# Patient Record
Sex: FEMALE | Race: WHITE | NOT HISPANIC OR LATINO | Employment: OTHER | URBAN - METROPOLITAN AREA
[De-identification: names, ages, dates, MRNs, and addresses within clinical notes are randomized per-mention and may not be internally consistent; named-entity substitution may affect disease eponyms.]

---

## 2017-03-30 ENCOUNTER — HOSPITAL ENCOUNTER (OUTPATIENT)
Dept: RADIOLOGY | Facility: MEDICAL CENTER | Age: 62
End: 2017-03-30
Attending: FAMILY MEDICINE
Payer: COMMERCIAL

## 2017-03-30 DIAGNOSIS — Z13.9 SCREENING: ICD-10-CM

## 2017-03-30 PROCEDURE — 77063 BREAST TOMOSYNTHESIS BI: CPT

## 2018-04-02 ENCOUNTER — HOSPITAL ENCOUNTER (OUTPATIENT)
Dept: RADIOLOGY | Facility: MEDICAL CENTER | Age: 63
End: 2018-04-02
Attending: FAMILY MEDICINE
Payer: COMMERCIAL

## 2018-04-02 DIAGNOSIS — Z12.31 SCREENING MAMMOGRAM, ENCOUNTER FOR: ICD-10-CM

## 2018-04-02 PROCEDURE — 77067 SCR MAMMO BI INCL CAD: CPT

## 2019-04-03 ENCOUNTER — HOSPITAL ENCOUNTER (OUTPATIENT)
Dept: RADIOLOGY | Facility: MEDICAL CENTER | Age: 64
End: 2019-04-03
Attending: FAMILY MEDICINE
Payer: COMMERCIAL

## 2019-04-03 DIAGNOSIS — Z12.39 SCREENING BREAST EXAMINATION: ICD-10-CM

## 2019-04-03 PROCEDURE — 77063 BREAST TOMOSYNTHESIS BI: CPT

## 2019-05-22 ENCOUNTER — APPOINTMENT (RX ONLY)
Dept: URBAN - METROPOLITAN AREA CLINIC 35 | Facility: CLINIC | Age: 64
Setting detail: DERMATOLOGY
End: 2019-05-22

## 2019-05-22 DIAGNOSIS — L81.4 OTHER MELANIN HYPERPIGMENTATION: ICD-10-CM

## 2019-05-22 DIAGNOSIS — Z71.89 OTHER SPECIFIED COUNSELING: ICD-10-CM

## 2019-05-22 DIAGNOSIS — D22 MELANOCYTIC NEVI: ICD-10-CM

## 2019-05-22 DIAGNOSIS — L81.7 PIGMENTED PURPURIC DERMATOSIS: ICD-10-CM

## 2019-05-22 DIAGNOSIS — L57.0 ACTINIC KERATOSIS: ICD-10-CM

## 2019-05-22 DIAGNOSIS — L82.1 OTHER SEBORRHEIC KERATOSIS: ICD-10-CM

## 2019-05-22 PROBLEM — D22.5 MELANOCYTIC NEVI OF TRUNK: Status: ACTIVE | Noted: 2019-05-22

## 2019-05-22 PROCEDURE — 99213 OFFICE O/P EST LOW 20 MIN: CPT | Mod: 25

## 2019-05-22 PROCEDURE — 17000 DESTRUCT PREMALG LESION: CPT

## 2019-05-22 PROCEDURE — ? COUNSELING

## 2019-05-22 PROCEDURE — ? LIQUID NITROGEN

## 2019-05-22 PROCEDURE — ? OBSERVATION AND MEASURE

## 2019-05-22 ASSESSMENT — LOCATION DETAILED DESCRIPTION DERM
LOCATION DETAILED: RIGHT ANTERIOR DISTAL UPPER ARM
LOCATION DETAILED: RIGHT LATERAL TRAPEZIAL NECK
LOCATION DETAILED: MID-FRONTAL SCALP
LOCATION DETAILED: LEFT MEDIAL SUPERIOR CHEST
LOCATION DETAILED: RIGHT PROXIMAL PRETIBIAL REGION
LOCATION DETAILED: RIGHT MEDIAL TRAPEZIAL NECK
LOCATION DETAILED: LEFT PROXIMAL PRETIBIAL REGION
LOCATION DETAILED: RIGHT SUPERIOR UPPER BACK

## 2019-05-22 ASSESSMENT — LOCATION ZONE DERM
LOCATION ZONE: ARM
LOCATION ZONE: SCALP
LOCATION ZONE: TRUNK
LOCATION ZONE: LEG
LOCATION ZONE: NECK

## 2019-05-22 ASSESSMENT — LOCATION SIMPLE DESCRIPTION DERM
LOCATION SIMPLE: ANTERIOR SCALP
LOCATION SIMPLE: POSTERIOR NECK
LOCATION SIMPLE: CHEST
LOCATION SIMPLE: RIGHT UPPER ARM
LOCATION SIMPLE: RIGHT UPPER BACK
LOCATION SIMPLE: RIGHT PRETIBIAL REGION
LOCATION SIMPLE: LEFT PRETIBIAL REGION

## 2019-05-22 NOTE — PROCEDURE: LIQUID NITROGEN
Duration Of Freeze Thaw-Cycle (Seconds): 10
Render Note In Bullet Format When Appropriate: No
Number Of Freeze-Thaw Cycles: 1 freeze-thaw cycle
Post-Care Instructions: I reviewed with the patient in detail post-care instructions. Patient is to wear sunprotection, and avoid picking at any of the treated lesions. Pt may apply Vaseline to crusted or scabbing areas.
Detail Level: Detailed
Consent: The patient's consent was obtained including but not limited to risks of crusting, scabbing, blistering, scarring, darker or lighter pigmentary change, recurrence, incomplete removal and infection.

## 2020-05-20 ENCOUNTER — APPOINTMENT (OUTPATIENT)
Dept: RADIOLOGY | Facility: MEDICAL CENTER | Age: 65
End: 2020-05-20
Attending: FAMILY MEDICINE
Payer: COMMERCIAL

## 2020-05-20 DIAGNOSIS — Z12.31 VISIT FOR SCREENING MAMMOGRAM: ICD-10-CM

## 2020-05-20 PROCEDURE — 77067 SCR MAMMO BI INCL CAD: CPT

## 2020-06-03 ENCOUNTER — APPOINTMENT (RX ONLY)
Dept: URBAN - METROPOLITAN AREA CLINIC 35 | Facility: CLINIC | Age: 65
Setting detail: DERMATOLOGY
End: 2020-06-03

## 2020-06-03 DIAGNOSIS — L82.1 OTHER SEBORRHEIC KERATOSIS: ICD-10-CM

## 2020-06-03 DIAGNOSIS — L81.4 OTHER MELANIN HYPERPIGMENTATION: ICD-10-CM

## 2020-06-03 DIAGNOSIS — L82.0 INFLAMED SEBORRHEIC KERATOSIS: ICD-10-CM

## 2020-06-03 DIAGNOSIS — L90.5 SCAR CONDITIONS AND FIBROSIS OF SKIN: ICD-10-CM

## 2020-06-03 DIAGNOSIS — Z71.89 OTHER SPECIFIED COUNSELING: ICD-10-CM

## 2020-06-03 DIAGNOSIS — D22 MELANOCYTIC NEVI: ICD-10-CM

## 2020-06-03 PROBLEM — D22.5 MELANOCYTIC NEVI OF TRUNK: Status: ACTIVE | Noted: 2020-06-03

## 2020-06-03 PROCEDURE — 99213 OFFICE O/P EST LOW 20 MIN: CPT | Mod: 25

## 2020-06-03 PROCEDURE — 17110 DESTRUCTION B9 LES UP TO 14: CPT

## 2020-06-03 PROCEDURE — ? COUNSELING

## 2020-06-03 PROCEDURE — ? LIQUID NITROGEN

## 2020-06-03 ASSESSMENT — LOCATION DETAILED DESCRIPTION DERM
LOCATION DETAILED: LEFT CENTRAL EYEBROW
LOCATION DETAILED: RIGHT ANTERIOR DISTAL UPPER ARM
LOCATION DETAILED: RIGHT VENTRAL PROXIMAL FOREARM
LOCATION DETAILED: INFERIOR THORACIC SPINE
LOCATION DETAILED: RIGHT SUPERIOR UPPER BACK
LOCATION DETAILED: RIGHT CENTRAL EYEBROW
LOCATION DETAILED: RIGHT MEDIAL TRAPEZIAL NECK
LOCATION DETAILED: RIGHT PROXIMAL DORSAL FOREARM
LOCATION DETAILED: LEFT SUPERIOR MEDIAL UPPER BACK
LOCATION DETAILED: RIGHT LATERAL TRAPEZIAL NECK
LOCATION DETAILED: RIGHT MEDIAL SUPERIOR CHEST
LOCATION DETAILED: POSTERIOR MID-PARIETAL SCALP

## 2020-06-03 ASSESSMENT — LOCATION SIMPLE DESCRIPTION DERM
LOCATION SIMPLE: RIGHT FOREARM
LOCATION SIMPLE: RIGHT UPPER ARM
LOCATION SIMPLE: POSTERIOR SCALP
LOCATION SIMPLE: RIGHT EYEBROW
LOCATION SIMPLE: POSTERIOR NECK
LOCATION SIMPLE: LEFT EYEBROW
LOCATION SIMPLE: CHEST
LOCATION SIMPLE: LEFT UPPER BACK
LOCATION SIMPLE: UPPER BACK
LOCATION SIMPLE: RIGHT UPPER BACK

## 2020-06-03 ASSESSMENT — LOCATION ZONE DERM
LOCATION ZONE: SCALP
LOCATION ZONE: ARM
LOCATION ZONE: NECK
LOCATION ZONE: FACE
LOCATION ZONE: TRUNK

## 2020-06-03 NOTE — PROCEDURE: LIQUID NITROGEN
Post-Care Instructions: I reviewed with the patient in detail post-care instructions. Patient is to wear sunprotection, and avoid picking at any of the treated lesions. Pt may apply Vaseline to crusted or scabbing areas.
Medical Necessity Clause: This procedure was medically necessary because the lesions that were treated were:
Number Of Freeze-Thaw Cycles: 2 freeze-thaw cycles
Render Note In Bullet Format When Appropriate: No
Detail Level: Detailed
Consent: The patient's consent was obtained including but not limited to risks of crusting, scabbing, blistering, scarring, darker or lighter pigmentary change, recurrence, incomplete removal and infection.
Medical Necessity Information: It is in your best interest to select a reason for this procedure from the list below. All of these items fulfill various CMS LCD requirements except the new and changing color options.
Duration Of Freeze Thaw-Cycle (Seconds): 10

## 2020-12-03 ENCOUNTER — TELEPHONE (OUTPATIENT)
Dept: OPHTHALMOLOGY | Facility: MEDICAL CENTER | Age: 65
End: 2020-12-03

## 2021-03-02 ENCOUNTER — OFFICE VISIT (OUTPATIENT)
Dept: OPHTHALMOLOGY | Facility: MEDICAL CENTER | Age: 66
End: 2021-03-02
Payer: MEDICARE

## 2021-03-02 DIAGNOSIS — H49.9 OPHTHALMOPLEGIA: ICD-10-CM

## 2021-03-02 DIAGNOSIS — E05.00 GRAVES DISEASE: ICD-10-CM

## 2021-03-02 DIAGNOSIS — Z96.1 PSEUDOPHAKIA OF BOTH EYES: ICD-10-CM

## 2021-03-02 PROBLEM — H40.2230 CHRONIC PRIMARY ANGLE-CLOSURE GLAUCOMA OF BOTH EYES: Status: ACTIVE | Noted: 2021-03-02

## 2021-03-02 PROCEDURE — 92060 SENSORIMOTOR EXAMINATION: CPT | Performed by: OPHTHALMOLOGY

## 2021-03-02 PROCEDURE — 99204 OFFICE O/P NEW MOD 45 MIN: CPT | Performed by: OPHTHALMOLOGY

## 2021-03-02 RX ORDER — LATANOPROST 50 UG/ML
SOLUTION/ DROPS OPHTHALMIC
COMMUNITY
Start: 2021-02-02

## 2021-03-02 RX ORDER — CYCLOSPORINE 0.5 MG/ML
EMULSION OPHTHALMIC
COMMUNITY
Start: 2021-02-26

## 2021-03-02 ASSESSMENT — REFRACTION_MANIFEST
OS_AXIS: 098
OD_SPHERE: -0.25
OS_CYLINDER: +0.75
METHOD_AUTOREFRACTION: 1
OD_AXIS: 031
OD_CYLINDER: +0.50
OS_SPHERE: -4.00

## 2021-03-02 ASSESSMENT — ENCOUNTER SYMPTOMS
DOUBLE VISION: 1
EYE REDNESS: 1

## 2021-03-02 ASSESSMENT — CUP TO DISC RATIO
OS_RATIO: 0.8
OD_RATIO: 0.7

## 2021-03-02 ASSESSMENT — REFRACTION_WEARINGRX
OS_AXIS: 084
OD_SPHERE: PLANO
SPECS_TYPE: PAL
OS_SPHERE: -2.50
OD_AXIS: 028
OD_ADD: +2.00
OS_CYLINDER: +0.25
OD_CYLINDER: +0.75
OS_ADD: +2.00

## 2021-03-02 ASSESSMENT — VISUAL ACUITY
CORRECTION_TYPE: GLASSES
OD_CC+: -1
OS_CC+: -1
METHOD: SNELLEN - LINEAR
OS_CC: 20/25
OD_CC: 20/20

## 2021-03-02 ASSESSMENT — TONOMETRY
OS_IOP_MMHG: 14
OD_IOP_MMHG: 15
IOP_METHOD: ICARE

## 2021-03-02 ASSESSMENT — EXTERNAL EXAM - LEFT EYE: OS_EXAM: PROPTOSIS

## 2021-03-02 ASSESSMENT — SLIT LAMP EXAM - LIDS
COMMENTS: NORMAL
COMMENTS: NORMAL

## 2021-03-02 ASSESSMENT — CONF VISUAL FIELD
OS_NORMAL: 1
OD_NORMAL: 1

## 2021-03-02 ASSESSMENT — EXTERNAL EXAM - RIGHT EYE: OD_EXAM: PROPTOSIS

## 2021-03-02 NOTE — ASSESSMENT & PLAN NOTE
3/2/2021 - sp stents during cataract extraction, but now back on drops. IOP today 11 OU. Being monitored by [facility]

## 2021-03-02 NOTE — ASSESSMENT & PLAN NOTE
3/2/2021 - IOL in place, monovision. OS for near. Blurry vision without balance from glasses makes fusion more difficut

## 2021-03-02 NOTE — ASSESSMENT & PLAN NOTE
3/2/2021 - in comitant small angle esotropia worse on left and right horizontal gaze associated with a mild abduction deficit, history of high myopia and graves disease. 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 Some break down in fusion when not wearing glasses, and was made monovision following cataract extraction. I discussed option of prisms in glasses or surgery, but since no diplopia with glasses on discussed continuing to monitor.

## 2021-03-02 NOTE — ASSESSMENT & PLAN NOTE
3/2/2021 - mild stare and lid retraction, johnson not appear to be progressive, but discussed if worsens would be a candidate for tepezza

## 2021-03-02 NOTE — PROGRESS NOTES
Peds/Neuro Ophthalmology:   Kenny Castillo M.D.    Date & Time note created:    3/2/2021   11:34 AM     Referring MD / APRN:  Oscar Schrader M.D., No att. providers found    Patient ID:  Name:             Jacy Muhammad   YOB: 1955  Age:                 65 y.o.  female   MRN:               [id number]    Chief Complaint/Reason for Visit:     Esotropia      History of Present Illness:    Jacy Muhammad is a 65 y.o. female   65 yr old referred, for double vision. Pt has a history of cataract surgery and glaucoma she had a drain also put in and had lasik to improve vision. Pt has history of dry eyes, red eyes no other issues. Cataract extraction 2017. Some double vision before that. Stents at same time as cataract surgery. Had LASIK 2002 started glasses at age 10 years. History of Graves and LOPEZ 1991.       Review of Systems:  Review of Systems   Eyes: Positive for double vision and redness.   All other systems reviewed and are negative.      Past Medical History:   Past Medical History:   Diagnosis Date   • Allergy    • Arthritis    • Thyroid disease        Past Surgical History:  Past Surgical History:   Procedure Laterality Date   • ABDOMINAL HYSTERECTOMY TOTAL     • CATARACT EXTRACTION WITH IOL     • EYE SURGERY      lasik   • ROTATOR CUFF REPAIR         Current Outpatient Medications:  Current Outpatient Medications   Medication Sig Dispense Refill   • Levothyroxine Sodium (SYNTHROID PO) Take  by mouth. Takes 112 mcg and 125 mcg     • RESTASIS 0.05 % ophthalmic emulsion INSTILL 1 DROP INTO EACH EYE TWICE DAILY     • latanoprost (XALATAN) 0.005 % Solution        No current facility-administered medications for this visit.       Allergies:  Allergies   Allergen Reactions   • Pcn [Penicillins]    • Sulfamethoxazole        Family History:  Family History   Problem Relation Age of Onset   • Stroke Mother    • Heart Disease Father        Social History:  Social History     Socioeconomic  History   • Marital status: Single     Spouse name: Not on file   • Number of children: Not on file   • Years of education: Not on file   • Highest education level: Not on file   Occupational History   • Not on file   Tobacco Use   • Smoking status: Unknown If Ever Smoked   Substance and Sexual Activity   • Alcohol use: Not on file   • Drug use: Not on file   • Sexual activity: Not on file   Other Topics Concern   • Not on file   Social History Narrative   • Not on file     Social Determinants of Health     Financial Resource Strain:    • Difficulty of Paying Living Expenses:    Food Insecurity:    • Worried About Running Out of Food in the Last Year:    • Ran Out of Food in the Last Year:    Transportation Needs:    • Lack of Transportation (Medical):    • Lack of Transportation (Non-Medical):    Physical Activity:    • Days of Exercise per Week:    • Minutes of Exercise per Session:    Stress:    • Feeling of Stress :    Social Connections:    • Frequency of Communication with Friends and Family:    • Frequency of Social Gatherings with Friends and Family:    • Attends Christian Services:    • Active Member of Clubs or Organizations:    • Attends Club or Organization Meetings:    • Marital Status:    Intimate Partner Violence:    • Fear of Current or Ex-Partner:    • Emotionally Abused:    • Physically Abused:    • Sexually Abused:           Physical Exam:  Physical Exam    Oriented x 3  Weight/BMI: There is no height or weight on file to calculate BMI.  There were no vitals taken for this visit.    Base Eye Exam     Visual Acuity (Snellen - Linear)       Right Left    Dist cc 20/20 -1 20/25 -1    Correction: Glasses          Tonometry ([facility] , 10:19 am )       Right Left    Pressure 15 14          Pupils       Pupils    Right PERRL    Left PERRL          Visual Fields       Right Left     Full Full          Neuro/Psych     Oriented x3: Yes    Mood/Affect: Normal          Dilation     able to view wihtout dilation              Additional Tests     Color       Right Left    Ishihara 9/9 9/9          Stereo     Fly: +    Animals: 3/3    Circles: 8/9            Strabismus Exam       0 0 0   0 0 0                      ET 2 -1  0  Ortho  0  -1  ET 2                     0 0 0   0 0 0                3/2/2021 - without 2 ET distance     Slit Lamp and Fundus Exam     External Exam       Right Left    External Proptosis Proptosis          Slit Lamp Exam       Right Left    Lids/Lashes Normal Normal    Conjunctiva/Sclera White and quiet White and quiet    Cornea Clear Clear    Anterior Chamber Deep and quiet Deep and quiet    Iris Round and reactive Round and reactive    Lens Posterior chamber intraocular lens Posterior chamber intraocular lens    Vitreous Normal Normal          Fundus Exam       Right Left    Disc Tilted cup Tilted cup    C/D Ratio 0.7 0.8    Macula Normal Normal    Vessels Normal Normal    Periphery Normal Normal            Refraction     Wearing Rx       Sphere Cylinder Axis Add    Right [place] +0.75 028 +2.00    Left -2.50 +0.25 084 +2.00    Type: PAL          Manifest Refraction (Auto)       Sphere Cylinder Axis    Right -0.25 +0.50 031    Left -4.00 +0.75 098                Pertinent Lab/Test/Imaging Review:  Review of records from Dr Wray office    Assessment and Plan:     Ophthalmoplegia  3/2/2021 - in comitant small angle esotropia worse on left and right horizontal gaze associated with a mild abduction deficit, history of high myopia and graves disease. 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 Some break down in fusion when not wearing glasses, and was made monovision following cataract extraction. I discussed option of prisms in glasses or surgery, but since no diplopia with glasses on discussed continuing to monitor.     Graves disease  3/2/2021 - mild stare and lid retraction, johnson not appear to be progressive, but  discussed if worsens would be a candidate for tepezza    Chronic primary angle-closure glaucoma of both eyes  3/2/2021 - sp stents during cataract extraction, but now back on drops. IOP today 11 OU. Being monitored by [facility]    Pseudophakia of both eyes  3/2/2021 - IOL in place, monovision. OS for near. Blurry vision without balance from glasses makes fusion more difficut        Kenny Castillo M.D.

## 2021-05-21 ENCOUNTER — HOSPITAL ENCOUNTER (OUTPATIENT)
Dept: RADIOLOGY | Facility: MEDICAL CENTER | Age: 66
End: 2021-05-21
Attending: FAMILY MEDICINE
Payer: MEDICARE

## 2021-05-21 DIAGNOSIS — Z12.31 VISIT FOR SCREENING MAMMOGRAM: ICD-10-CM

## 2021-05-21 PROCEDURE — 77063 BREAST TOMOSYNTHESIS BI: CPT

## 2021-06-07 ENCOUNTER — APPOINTMENT (RX ONLY)
Dept: URBAN - METROPOLITAN AREA CLINIC 35 | Facility: CLINIC | Age: 66
Setting detail: DERMATOLOGY
End: 2021-06-07

## 2021-06-07 DIAGNOSIS — L81.4 OTHER MELANIN HYPERPIGMENTATION: ICD-10-CM

## 2021-06-07 DIAGNOSIS — D22 MELANOCYTIC NEVI: ICD-10-CM

## 2021-06-07 DIAGNOSIS — Z71.89 OTHER SPECIFIED COUNSELING: ICD-10-CM

## 2021-06-07 DIAGNOSIS — L82.0 INFLAMED SEBORRHEIC KERATOSIS: ICD-10-CM

## 2021-06-07 DIAGNOSIS — L82.1 OTHER SEBORRHEIC KERATOSIS: ICD-10-CM

## 2021-06-07 PROBLEM — D22.5 MELANOCYTIC NEVI OF TRUNK: Status: ACTIVE | Noted: 2021-06-07

## 2021-06-07 PROCEDURE — 99213 OFFICE O/P EST LOW 20 MIN: CPT | Mod: 25

## 2021-06-07 PROCEDURE — ? COUNSELING

## 2021-06-07 PROCEDURE — ? LIQUID NITROGEN

## 2021-06-07 PROCEDURE — 17110 DESTRUCTION B9 LES UP TO 14: CPT

## 2021-06-07 ASSESSMENT — LOCATION ZONE DERM
LOCATION ZONE: NECK
LOCATION ZONE: ARM
LOCATION ZONE: SCALP
LOCATION ZONE: TRUNK

## 2021-06-07 ASSESSMENT — LOCATION DETAILED DESCRIPTION DERM
LOCATION DETAILED: RIGHT MEDIAL TRAPEZIAL NECK
LOCATION DETAILED: RIGHT LATERAL TRAPEZIAL NECK
LOCATION DETAILED: LEFT POSTERIOR SHOULDER
LOCATION DETAILED: LEFT SUPERIOR OCCIPITAL SCALP
LOCATION DETAILED: RIGHT SUPERIOR OCCIPITAL SCALP
LOCATION DETAILED: RIGHT SUPERIOR UPPER BACK

## 2021-06-07 ASSESSMENT — LOCATION SIMPLE DESCRIPTION DERM
LOCATION SIMPLE: LEFT OCCIPITAL SCALP
LOCATION SIMPLE: POSTERIOR NECK
LOCATION SIMPLE: LEFT SHOULDER
LOCATION SIMPLE: RIGHT OCCIPITAL SCALP
LOCATION SIMPLE: RIGHT UPPER BACK

## 2021-06-07 NOTE — PROCEDURE: LIQUID NITROGEN
Render Note In Bullet Format When Appropriate: No
Detail Level: Detailed
Consent: The patient's consent was obtained including but not limited to risks of crusting, scabbing, blistering, scarring, darker or lighter pigmentary change, recurrence, incomplete removal and infection.
Medical Necessity Information: It is in your best interest to select a reason for this procedure from the list below. All of these items fulfill various CMS LCD requirements except the new and changing color options.
Medical Necessity Clause: This procedure was medically necessary because the lesions that were treated were:
Duration Of Freeze Thaw-Cycle (Seconds): 10
Number Of Freeze-Thaw Cycles: 2 freeze-thaw cycles
Post-Care Instructions: I reviewed with the patient in detail post-care instructions. Patient is to wear sunprotection, and avoid picking at any of the treated lesions. Pt may apply Vaseline to crusted or scabbing areas.

## 2022-03-02 ENCOUNTER — OFFICE VISIT (OUTPATIENT)
Dept: OPHTHALMOLOGY | Facility: MEDICAL CENTER | Age: 67
End: 2022-03-02
Payer: MEDICARE

## 2022-03-02 DIAGNOSIS — E05.00 GRAVES DISEASE: ICD-10-CM

## 2022-03-02 DIAGNOSIS — H40.2230 CHRONIC PRIMARY ANGLE-CLOSURE GLAUCOMA OF BOTH EYES, UNSPECIFIED GLAUCOMA STAGE: ICD-10-CM

## 2022-03-02 DIAGNOSIS — H49.9 OPHTHALMOPLEGIA: ICD-10-CM

## 2022-03-02 PROCEDURE — 92014 COMPRE OPH EXAM EST PT 1/>: CPT | Performed by: OPHTHALMOLOGY

## 2022-03-02 PROCEDURE — 92060 SENSORIMOTOR EXAMINATION: CPT | Performed by: OPHTHALMOLOGY

## 2022-03-02 RX ORDER — LEVOTHYROXINE SODIUM 0.12 MG/1
TABLET ORAL
COMMUNITY
Start: 2021-12-26

## 2022-03-02 RX ORDER — ATORVASTATIN CALCIUM 10 MG/1
10 TABLET, FILM COATED ORAL DAILY
COMMUNITY
Start: 2022-02-08

## 2022-03-02 ASSESSMENT — REFRACTION_WEARINGRX
OD_SPHERE: -0.25
OS_CYLINDER: +0.50
OD_HBASE: OUT
OS_AXIS: 131
SPECS_TYPE: BIFOCAL
OS_SPHERE: -3.75
OD_VBASE: DOWN
OS_ADD: +2.50
OD_VPRISM: 0.5
OD_AXIS: 010
OD_CYLINDER: +0.75
OS_VBASE: UP
OD_ADD: +2.50
OS_VPRISM: 2.5
OD_HPRISM: 1.0

## 2022-03-02 ASSESSMENT — VISUAL ACUITY
OS_CC: 20/25
OD_CC+: -1
METHOD: SNELLEN - LINEAR
CORRECTION_TYPE: GLASSES
OD_CC: 20/20
OS_CC+: +2

## 2022-03-02 ASSESSMENT — EXTERNAL EXAM - RIGHT EYE: OD_EXAM: PROPTOSIS

## 2022-03-02 ASSESSMENT — CONF VISUAL FIELD
OS_NORMAL: 1
OD_NORMAL: 1

## 2022-03-02 ASSESSMENT — TONOMETRY
OD_IOP_MMHG: 9
IOP_METHOD: I-CARE
OS_IOP_MMHG: 9

## 2022-03-02 ASSESSMENT — ENCOUNTER SYMPTOMS: DOUBLE VISION: 1

## 2022-03-02 ASSESSMENT — CUP TO DISC RATIO
OD_RATIO: 0.7
OS_RATIO: 0.8

## 2022-03-02 ASSESSMENT — REFRACTION_MANIFEST
OS_CYLINDER: +0.75
OD_CYLINDER: +0.50
OS_SPHERE: -4.50
OS_AXIS: 102
METHOD_AUTOREFRACTION: 1
OD_AXIS: 037
OD_SPHERE: -0.50

## 2022-03-02 ASSESSMENT — EXTERNAL EXAM - LEFT EYE: OS_EXAM: PROPTOSIS

## 2022-03-02 ASSESSMENT — SLIT LAMP EXAM - LIDS
COMMENTS: NORMAL
COMMENTS: NORMAL

## 2022-03-02 NOTE — ASSESSMENT & PLAN NOTE
3/2/2021 - sp stents during cataract extraction, but now back on drops. IOP today 11 OU. Being monitored by [facility]  3/2/2022 - IOP stable, now on Xalatan, being followed by glaucoma

## 2022-03-02 NOTE — ASSESSMENT & PLAN NOTE
3/2/2021 - mild stare and lid retraction, johnson not appear to be progressive, but discussed if worsens would be a candidate for tepezza  3/2/2022 - still with mild tare, lid retraction and dry eye. Overall stable and uses restasis

## 2022-03-02 NOTE — ASSESSMENT & PLAN NOTE
3/2/2021 - in comitant small angle esotropia worse on left and right horizontal gaze associated with a mild abduction deficit, history of high myopia and graves disease. 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 Some break down in fusion when not wearing glasses, and was made monovision following cataract extraction. I discussed option of prisms in glasses or surgery, but since no diplopia with glasses on discussed continuing to monitor.   3/2/2022 - Got prism glasses by optom with 2 base up OS, but has induced a small 2 left hyper. Given fusional amplitudes can tolerate. Also possibly has a small 1 base out OD. Orth from horizontal with glasses, but without develops a 2 ET most likely exacerbated by monovosion. Overall stable.

## 2022-03-02 NOTE — PROGRESS NOTES
Peds/Neuro Ophthalmology:   Kenny Castillo M.D.    Date & Time note created:    3/2/2022   11:30 AM     Referring MD / APRN:  Oscar Schrader M.D., No att. providers found    Patient ID:  Name:             Jacy Muhammad   YOB: 1955  Age:                 66 y.o.  female   MRN:               [id number]    Chief Complaint/Reason for Visit:     Graves' Disease (1 year F/u for Ophthalmoplegia for both eyes)      History of Present Illness:    Jacy Muhammad is a 66 y.o. female   Pt is here for 1 year F/u for Ophthalmoplegia and Graves disease. Pt states vision is stable with glasses. No changes since last eye exam. Pt does have dry eyes but she use Restasis BID OU. Pt denies headaches. Pt states a year ago she had really bad vision but since she got her new glasses with prism she has noticed the double vision has improved. Using Xalatan at night. Got new glasses in October. When got new glasses got with prism.       Review of Systems:  Review of Systems   Eyes: Positive for double vision.        Ophthalmoplegia  Graves disease  Dry eyes OU   All other systems reviewed and are negative.      Past Medical History:   Past Medical History:   Diagnosis Date   • Allergy    • Arthritis    • Hyperlipidemia    • Thyroid disease        Past Surgical History:  Past Surgical History:   Procedure Laterality Date   • ABDOMINAL HYSTERECTOMY TOTAL     • CATARACT EXTRACTION WITH IOL     • EYE SURGERY      lasik   • ROTATOR CUFF REPAIR         Current Outpatient Medications:  Current Outpatient Medications   Medication Sig Dispense Refill   • atorvastatin (LIPITOR) 10 MG Tab Take 10 mg by mouth every day.     • RESTASIS 0.05 % ophthalmic emulsion INSTILL 1 DROP INTO EACH EYE TWICE DAILY     • latanoprost (XALATAN) 0.005 % Solution      • Levothyroxine Sodium (SYNTHROID PO) Take  by mouth. Takes 112 mcg and 125 mcg     • levothyroxine (SYNTHROID) 125 MCG Tab TAKE 1 TABLET BY MOUTH EVERY OTHER DAY ON AN  EMPTY STOMACH ON ODD DAYS (ALTERNATING WITH 112 MCG ON EVEN DAYS) (Patient not taking: Reported on 3/2/2022)       No current facility-administered medications for this visit.       Allergies:  Allergies   Allergen Reactions   • Pcn [Penicillins]    • Sulfamethoxazole        Family History:  Family History   Problem Relation Age of Onset   • Stroke Mother    • Heart Disease Father        Social History:  Social History     Socioeconomic History   • Marital status: Single     Spouse name: Not on file   • Number of children: Not on file   • Years of education: Not on file   • Highest education level: Not on file   Occupational History   • Not on file   Tobacco Use   • Smoking status: Unknown If Ever Smoked   • Smokeless tobacco: Never Used   Substance and Sexual Activity   • Alcohol use: Not on file   • Drug use: Not on file   • Sexual activity: Not on file   Other Topics Concern   • Not on file   Social History Narrative   • Not on file     Social Determinants of Health     Financial Resource Strain: Not on file   Food Insecurity: Not on file   Transportation Needs: Not on file   Physical Activity: Not on file   Stress: Not on file   Social Connections: Not on file   Intimate Partner Violence: Not on file   Housing Stability: Not on file          Physical Exam:  Physical Exam    Oriented x 3  Weight/BMI: There is no height or weight on file to calculate BMI.  There were no vitals taken for this visit.    Base Eye Exam     Visual Acuity (Snellen - Linear)       Right Left    Dist cc 20/20 -1 20/25 +2    Dist ph cc NI NI    Correction: Glasses          Tonometry ([facility], 10:42 AM)       Right Left    Pressure 9 9          Pupils       Pupils    Right PERRL    Left PERRL          Visual Fields       Right Left     Full Full          Neuro/Psych     Oriented x3: Yes    Mood/Affect: Normal          Dilation     Both eyes: able to view wihtout dilation @ 11:27 AM            Additional Tests     Stereo     Fly: +             Strabismus Exam     Correction: sc    Distance Near Near +3DS N Bifocals                    0 0 0   0 0 0                      ET 2 -1  0  Ortho  0  -1  ET 2                     0 0 0   0 0 0                3/2/2021 - without 2 ET distance  3/2/2022 - With glasses on that has a 2 base UP OS, has a 2 Left hyper. Still 2 ET without glasses     Slit Lamp and Fundus Exam     External Exam       Right Left    External Proptosis Proptosis          Slit Lamp Exam       Right Left    Lids/Lashes Normal Normal    Conjunctiva/Sclera White and quiet White and quiet    Cornea Clear Clear    Anterior Chamber Deep and quiet Deep and quiet    Iris Round and reactive Round and reactive    Lens Posterior chamber intraocular lens Posterior chamber intraocular lens    Vitreous Normal Normal          Fundus Exam       Right Left    Disc Tilted cup Tilted cup    C/D Ratio 0.7 0.8    Macula Normal Normal    Vessels Normal Normal    Periphery Normal Normal            Refraction     Wearing Rx       Sphere Cylinder Axis Add Horz Prism Vert Prism    Right -0.25 +0.75 010 +2.50 1.0 out 0.5 down    Left -3.75 +0.50 131 +2.50  2.5 up    Age: 4m    Type: Bifocal          Manifest Refraction (Auto)       Sphere Cylinder Axis    Right -0.50 +0.50 037    Left -4.50 +0.75 102                Pertinent Lab/Test/Imaging Review:      Assessment and Plan:     Graves disease  3/2/2021 - mild stare and lid retraction, johnson not appear to be progressive, but discussed if worsens would be a candidate for tepezza  3/2/2022 - still with mild tare, lid retraction and dry eye. Overall stable and uses restasis    Ophthalmoplegia  3/2/2021 - in comitant small angle esotropia worse on left and right horizontal gaze associated with a mild abduction deficit, history of high myopia and graves disease. 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 Some break down in fusion when not  wearing glasses, and was made monovision following cataract extraction. I discussed option of prisms in glasses or surgery, but since no diplopia with glasses on discussed continuing to monitor.   3/2/2022 - Got prism glasses by optom with 2 base up OS, but has induced a small 2 left hyper. Given fusional amplitudes can tolerate. Also possibly has a small 1 base out OD. Orth from horizontal with glasses, but without develops a 2 ET most likely exacerbated by monovosion. Overall stable.     Chronic primary angle-closure glaucoma of both eyes  3/2/2021 - sp stents during cataract extraction, but now back on drops. IOP today 11 OU. Being monitored by [facility]  3/2/2022 - IOP stable, now on Xalatan, being followed by glaucoma        Kenny Castillo M.D.

## 2022-05-23 ENCOUNTER — HOSPITAL ENCOUNTER (OUTPATIENT)
Dept: RADIOLOGY | Facility: MEDICAL CENTER | Age: 67
End: 2022-05-23
Attending: FAMILY MEDICINE
Payer: MEDICARE

## 2022-05-23 DIAGNOSIS — Z12.31 VISIT FOR SCREENING MAMMOGRAM: ICD-10-CM

## 2022-05-23 PROCEDURE — 77063 BREAST TOMOSYNTHESIS BI: CPT

## 2022-06-08 ENCOUNTER — APPOINTMENT (RX ONLY)
Dept: URBAN - METROPOLITAN AREA CLINIC 35 | Facility: CLINIC | Age: 67
Setting detail: DERMATOLOGY
End: 2022-06-08

## 2022-06-08 DIAGNOSIS — Z71.89 OTHER SPECIFIED COUNSELING: ICD-10-CM

## 2022-06-08 DIAGNOSIS — L81.4 OTHER MELANIN HYPERPIGMENTATION: ICD-10-CM

## 2022-06-08 DIAGNOSIS — L82.0 INFLAMED SEBORRHEIC KERATOSIS: ICD-10-CM

## 2022-06-08 DIAGNOSIS — M71 OTHER BURSOPATHIES: ICD-10-CM

## 2022-06-08 DIAGNOSIS — D22 MELANOCYTIC NEVI: ICD-10-CM

## 2022-06-08 DIAGNOSIS — L82.1 OTHER SEBORRHEIC KERATOSIS: ICD-10-CM

## 2022-06-08 PROBLEM — D22.5 MELANOCYTIC NEVI OF TRUNK: Status: ACTIVE | Noted: 2022-06-08

## 2022-06-08 PROBLEM — M71.341 OTHER BURSAL CYST, RIGHT HAND: Status: ACTIVE | Noted: 2022-06-08

## 2022-06-08 PROCEDURE — 99213 OFFICE O/P EST LOW 20 MIN: CPT | Mod: 25

## 2022-06-08 PROCEDURE — ? LIQUID NITROGEN

## 2022-06-08 PROCEDURE — ? COUNSELING

## 2022-06-08 PROCEDURE — 17110 DESTRUCTION B9 LES UP TO 14: CPT

## 2022-06-08 PROCEDURE — ? OBSERVATION AND MEASURE

## 2022-06-08 ASSESSMENT — LOCATION DETAILED DESCRIPTION DERM
LOCATION DETAILED: LEFT SUPERIOR MEDIAL UPPER BACK
LOCATION DETAILED: RIGHT LATERAL TRAPEZIAL NECK
LOCATION DETAILED: LEFT SUPERIOR UPPER BACK
LOCATION DETAILED: RIGHT MEDIAL TRAPEZIAL NECK
LOCATION DETAILED: RIGHT SUPERIOR UPPER BACK
LOCATION DETAILED: RIGHT SUPERIOR MEDIAL UPPER BACK
LOCATION DETAILED: LEFT MEDIAL UPPER BACK
LOCATION DETAILED: LEFT SUPERIOR LATERAL MALAR CHEEK
LOCATION DETAILED: LEFT POSTERIOR SHOULDER
LOCATION DETAILED: RIGHT MID DORSAL MIDDLE FINGER
LOCATION DETAILED: RIGHT INFERIOR UPPER BACK

## 2022-06-08 ASSESSMENT — LOCATION SIMPLE DESCRIPTION DERM
LOCATION SIMPLE: POSTERIOR NECK
LOCATION SIMPLE: RIGHT MIDDLE FINGER
LOCATION SIMPLE: LEFT UPPER BACK
LOCATION SIMPLE: RIGHT UPPER BACK
LOCATION SIMPLE: LEFT SHOULDER
LOCATION SIMPLE: LEFT CHEEK

## 2022-06-08 ASSESSMENT — LOCATION ZONE DERM
LOCATION ZONE: FINGER
LOCATION ZONE: FACE
LOCATION ZONE: NECK
LOCATION ZONE: ARM
LOCATION ZONE: TRUNK

## 2022-06-08 NOTE — PROCEDURE: LIQUID NITROGEN
Consent: The patient's consent was obtained including but not limited to risks of crusting, scabbing, blistering, scarring, darker or lighter pigmentary change, recurrence, incomplete removal and infection.
Medical Necessity Clause: This procedure was medically necessary because the lesions that were treated were:
Spray Paint Text: The liquid nitrogen was applied to the skin utilizing a spray paint frosting technique.
Detail Level: Detailed
Render Note In Bullet Format When Appropriate: No
Medical Necessity Information: It is in your best interest to select a reason for this procedure from the list below. All of these items fulfill various CMS LCD requirements except the new and changing color options.
Number Of Freeze-Thaw Cycles: 2 freeze-thaw cycles
Show Applicator Variable?: Yes
Post-Care Instructions: I reviewed with the patient in detail post-care instructions. Patient is to wear sunprotection, and avoid picking at any of the treated lesions. Pt may apply Vaseline to crusted or scabbing areas.
Duration Of Freeze Thaw-Cycle (Seconds): 10

## 2023-04-24 ENCOUNTER — OFFICE VISIT (OUTPATIENT)
Dept: OPHTHALMOLOGY | Facility: MEDICAL CENTER | Age: 68
End: 2023-04-24
Payer: MEDICARE

## 2023-04-24 DIAGNOSIS — H40.003 GLAUCOMA SUSPECT OF BOTH EYES: ICD-10-CM

## 2023-04-24 DIAGNOSIS — Z96.1 PSEUDOPHAKIA OF BOTH EYES: ICD-10-CM

## 2023-04-24 DIAGNOSIS — E05.00 GRAVES DISEASE: ICD-10-CM

## 2023-04-24 DIAGNOSIS — H40.2230 CHRONIC PRIMARY ANGLE-CLOSURE GLAUCOMA OF BOTH EYES, UNSPECIFIED GLAUCOMA STAGE: ICD-10-CM

## 2023-04-24 DIAGNOSIS — H49.9 OPHTHALMOPLEGIA: ICD-10-CM

## 2023-04-24 PROCEDURE — 99214 OFFICE O/P EST MOD 30 MIN: CPT | Mod: 25 | Performed by: OPHTHALMOLOGY

## 2023-04-24 PROCEDURE — 92015 DETERMINE REFRACTIVE STATE: CPT | Performed by: OPHTHALMOLOGY

## 2023-04-24 PROCEDURE — 92060 SENSORIMOTOR EXAMINATION: CPT | Performed by: OPHTHALMOLOGY

## 2023-04-24 PROCEDURE — 92250 FUNDUS PHOTOGRAPHY W/I&R: CPT | Performed by: OPHTHALMOLOGY

## 2023-04-24 RX ORDER — HYDROCHLOROTHIAZIDE 12.5 MG/1
12.5 TABLET ORAL DAILY
COMMUNITY
Start: 2023-03-23

## 2023-04-24 RX ORDER — LEVOTHYROXINE SODIUM 112 UG/1
TABLET ORAL
COMMUNITY
Start: 2023-03-14

## 2023-04-24 RX ORDER — VIT C/B6/B5/MAGNESIUM/HERB 173 50-5-6-5MG
CAPSULE ORAL
COMMUNITY

## 2023-04-24 ASSESSMENT — REFRACTION_MANIFEST
OD_SPHERE: -0.50
METHOD_AUTOREFRACTION: 1
OS_SPHERE: -4.50
OS_CYLINDER: +0.50
OS_AXIS: 058
OD_CYLINDER: +0.25
OD_AXIS: 051

## 2023-04-24 ASSESSMENT — EXTERNAL EXAM - LEFT EYE: OS_EXAM: PROPTOSIS

## 2023-04-24 ASSESSMENT — REFRACTION_WEARINGRX
SPECS_TYPE: PAL
OS_SPHERE: -3.50
OS_AXIS: 152
OD_CYLINDER: +0.75
OD_SPHERE: -0.25
OD_AXIS: 003
OS_CYLINDER: +0.25

## 2023-04-24 ASSESSMENT — SLIT LAMP EXAM - LIDS
COMMENTS: NORMAL
COMMENTS: NORMAL

## 2023-04-24 ASSESSMENT — CONF VISUAL FIELD
OS_INFERIOR_NASAL_RESTRICTION: 0
OD_SUPERIOR_TEMPORAL_RESTRICTION: 0
OD_SUPERIOR_NASAL_RESTRICTION: 0
OD_NORMAL: 1
OS_INFERIOR_TEMPORAL_RESTRICTION: 0
OS_SUPERIOR_NASAL_RESTRICTION: 0
OD_INFERIOR_NASAL_RESTRICTION: 0
OS_SUPERIOR_TEMPORAL_RESTRICTION: 0
OS_NORMAL: 1
OD_INFERIOR_TEMPORAL_RESTRICTION: 0

## 2023-04-24 ASSESSMENT — ENCOUNTER SYMPTOMS: BLURRED VISION: 1

## 2023-04-24 ASSESSMENT — TONOMETRY
OD_IOP_MMHG: 9
OS_IOP_MMHG: 9
IOP_METHOD: I-CARE

## 2023-04-24 ASSESSMENT — EXTERNAL EXAM - RIGHT EYE: OD_EXAM: PROPTOSIS

## 2023-04-24 ASSESSMENT — CUP TO DISC RATIO
OD_RATIO: 0.7
OS_RATIO: 0.8

## 2023-04-24 ASSESSMENT — VISUAL ACUITY
METHOD: SNELLEN - LINEAR
OD_CC: 20/20
OS_CC: 20/20

## 2023-04-24 NOTE — ASSESSMENT & PLAN NOTE
3/2/2021 - sp stents during cataract extraction, but now back on drops. IOP today 11 OU. Being monitored by [facility]  3/2/2022 - IOP stable, now on Xalatan, being followed by glaucoma  4/24/2023-IOP stable on Xalatan.  OCT nerve fiber layer thickness 60 OU with superior and inferior thinning.  However there is significant peripapillary atrophy worse in the left eye secondary to her myopia

## 2023-04-24 NOTE — ASSESSMENT & PLAN NOTE
3/2/2021 - in comitant small angle esotropia worse on left and right horizontal gaze associated with a mild abduction deficit, history of high myopia and graves disease. 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 Some break down in fusion when not wearing glasses, and was made monovision following cataract extraction. I discussed option of prisms in glasses or surgery, but since no diplopia with glasses on discussed continuing to monitor.   3/2/2022 - Got prism glasses by optom with 2 base up OS, but has induced a small 2 left hyper. Given fusional amplitudes can tolerate. Also possibly has a small 1 base out OD. Orth from horizontal with glasses, but without develops a 2 ET most likely exacerbated by monovosion. Overall stable.   4/24/2023-still has very mild limited abduction deficit however I cannot measure a vertical component.  I then proceeded with Pettit negar testing which did not demonstrate a vertical deviation.  She states that sometimes when she is tired looking to the side she might get a little double vision however in prism adaptation testing today she did actually better without prisms.  We will therefore give her new glasses Rx and take out the prism

## 2023-04-24 NOTE — ASSESSMENT & PLAN NOTE
3/2/2021 - IOL in place, monovision. OS for near. Blurry vision without balance from glasses makes fusion more difficut  4/24/2023-IOL in place, continued monovision, no development of significant PCO.

## 2023-04-24 NOTE — ASSESSMENT & PLAN NOTE
3/2/2021 - mild stare and lid retraction, johnson not appear to be progressive, but discussed if worsens would be a candidate for tepezza  3/2/2022 - still with mild tare, lid retraction and dry eye. Overall stable and uses restasis  4/24/2023-still with mild stare, lid retraction, dry eye and moderate proptosis.  Discussed Tepezza however at this time she was not interested.

## 2023-04-24 NOTE — PROGRESS NOTES
Peds/Neuro Ophthalmology:   Kenny Castillo M.D.    Date & Time note created:    4/24/2023   2:59 PM     Referring MD / APRN:  Oscar Schrader M.D., No att. providers found    Patient ID:  Name:             Jacy Muhammad   YOB: 1955  Age:                 67 y.o.  female   MRN:               [id number]    Chief Complaint/Reason for Visit:     Graves' Disease (1 year follow up)      History of Present Illness:    Jacy Muhammad is a 67 y.o. female   1 year follow up for graves disease. No pain or discomfort. Pt is using eye drops per Dr. Alonso for dry eyes and glaucoma. Currently using Restasis BID and Latanoprost QHS. Pt states vision has decreased with correction. Mainly the right eye. No headaches.      Review of Systems:  Review of Systems   Eyes:  Positive for blurred vision.        Graves disease OU   All other systems reviewed and are negative.    Past Medical History:   Past Medical History:   Diagnosis Date    Allergy     Arthritis     Hyperlipidemia     Thyroid disease        Past Surgical History:  Past Surgical History:   Procedure Laterality Date    ABDOMINAL HYSTERECTOMY TOTAL      CATARACT EXTRACTION WITH IOL      EYE SURGERY      lasik    ROTATOR CUFF REPAIR         Current Outpatient Medications:  Current Outpatient Medications   Medication Sig Dispense Refill    hydroCHLOROthiazide (HYDRODIURIL) 12.5 MG tablet Take 12.5 mg by mouth every day.      levothyroxine (SYNTHROID) 112 MCG Tab TAKE 1 TABLET BY MOUTH ON EVEN DAYS      Calcium Carb-Cholecalciferol (CALCIUM 500 + D3 PO) Take  by mouth.      Turmeric 500 MG Cap Take  by mouth.      Lutein-Zeaxanthin 15-4.75 MG Cap Take  by mouth.      atorvastatin (LIPITOR) 10 MG Tab Take 10 mg by mouth every day.      levothyroxine (SYNTHROID) 125 MCG Tab       RESTASIS 0.05 % ophthalmic emulsion INSTILL 1 DROP INTO EACH EYE TWICE DAILY      latanoprost (XALATAN) 0.005 % Solution       Levothyroxine Sodium (SYNTHROID PO) Take   by mouth. Takes 112 mcg and 125 mcg (Patient not taking: Reported on 4/24/2023)       No current facility-administered medications for this visit.       Allergies:  Allergies   Allergen Reactions    Pcn [Penicillins]     Sulfamethoxazole        Family History:  Family History   Problem Relation Age of Onset    Stroke Mother     Heart Disease Father        Social History:  Social History     Socioeconomic History    Marital status: Single     Spouse name: Not on file    Number of children: Not on file    Years of education: Not on file    Highest education level: Not on file   Occupational History    Not on file   Tobacco Use    Smoking status: Unknown    Smokeless tobacco: Never   Substance and Sexual Activity    Alcohol use: Not on file    Drug use: Not on file    Sexual activity: Not on file   Other Topics Concern    Not on file   Social History Narrative    Not on file     Social Determinants of Health     Financial Resource Strain: Not on file   Food Insecurity: Not on file   Transportation Needs: Not on file   Physical Activity: Not on file   Stress: Not on file   Social Connections: Not on file   Intimate Partner Violence: Not on file   Housing Stability: Not on file          Physical Exam:  Physical Exam    Oriented x 3  Weight/BMI: There is no height or weight on file to calculate BMI.  There were no vitals taken for this visit.    Base Eye Exam       Visual Acuity (Snellen - Linear)         Right Left    Dist cc 20/20 20/20              Tonometry (i-care, 1:33 PM)         Right Left    Pressure 9 9              Pupils         Pupils    Right PERRL    Left PERRL              Visual Fields         Right Left     Full Full              Neuro/Psych       Oriented x3: Yes    Mood/Affect: Normal                  Strabismus Exam       Correction: sc      Distance Near Near +3DS N Bifocals                      0 0 0   0 0 0                      ET 2 -1  0  Ortho  0  -1  ET 2                     0 0 0   0 0 0                 3/2/2021 - without 2 ET distance  3/2/2022 - With glasses on that has a 2 base UP OS, has a 2 Left hyper. Still 2 ET without glasses       Slit Lamp and Fundus Exam       External Exam         Right Left    External Proptosis Proptosis              Slit Lamp Exam         Right Left    Lids/Lashes Normal Normal    Conjunctiva/Sclera White and quiet White and quiet    Cornea Clear Clear    Anterior Chamber Deep and quiet Deep and quiet    Iris Round and reactive Round and reactive    Lens Posterior chamber intraocular lens Posterior chamber intraocular lens    Vitreous Normal Normal              Fundus Exam         Right Left    Disc Tilted cup Tilted cup    C/D Ratio 0.7 0.8    Macula Normal Normal    Vessels Normal Normal    Periphery Normal Normal                  Refraction       Wearing Rx         Sphere Cylinder Axis    Right -0.25 +0.75 003    Left -3.50 +0.25 152      Type: PAL              Manifest Refraction (Auto)         Sphere Cylinder Axis    Right -0.50 +0.25 051    Left -4.50 +0.50 058              Final Rx         Sphere Cylinder Axis Add    Right -0.50   +2.50    Left -4.50 +0.25 060 +2.50                    Pertinent Lab/Test/Imaging Review:      Assessment and Plan:     Ophthalmoplegia  3/2/2021 - in comitant small angle esotropia worse on left and right horizontal gaze associated with a mild abduction deficit, history of high myopia and graves disease. 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 Some break down in fusion when not wearing glasses, and was made monovision following cataract extraction. I discussed option of prisms in glasses or surgery, but since no diplopia with glasses on discussed continuing to monitor.   3/2/2022 - Got prism glasses by optom with 2 base up OS, but has induced a small 2 left hyper. Given fusional amplitudes can tolerate. Also possibly has a small 1 base out OD. Orth from  horizontal with glasses, but without develops a 2 ET most likely exacerbated by monovosion. Overall stable.   4/24/2023-still has very mild limited abduction deficit however I cannot measure a vertical component.  I then proceeded with Pettit negar testing which did not demonstrate a vertical deviation.  She states that sometimes when she is tired looking to the side she might get a little double vision however in prism adaptation testing today she did actually better without prisms.  We will therefore give her new glasses Rx and take out the prism    Graves disease  3/2/2021 - mild stare and lid retraction, johnson not appear to be progressive, but discussed if worsens would be a candidate for tepezza  3/2/2022 - still with mild tare, lid retraction and dry eye. Overall stable and uses restasis  4/24/2023-still with mild stare, lid retraction, dry eye and moderate proptosis.  Discussed Tepezza however at this time she was not interested.    Chronic primary angle-closure glaucoma of both eyes  3/2/2021 - sp stents during cataract extraction, but now back on drops. IOP today 11 OU. Being monitored by [facility]  3/2/2022 - IOP stable, now on Xalatan, being followed by glaucoma  4/24/2023-IOP stable on Xalatan.  OCT nerve fiber layer thickness 60 OU with superior and inferior thinning.  However there is significant peripapillary atrophy worse in the left eye secondary to her myopia    Pseudophakia of both eyes  3/2/2021 - IOL in place, monovision. OS for near. Blurry vision without balance from glasses makes fusion more difficut  4/24/2023-IOL in place, continued monovision, no development of significant PCO.    Level 4 follow-up.  30 minutes spent.  This including discussion of to present including risks and benefits, extended prism adaptation testing, Pettit negar testing, discussing about eliminating prisms from glasses, giving new Rx, formulating note in epic.    Kenny Castillo M.D.

## 2023-05-24 ENCOUNTER — HOSPITAL ENCOUNTER (OUTPATIENT)
Dept: RADIOLOGY | Facility: MEDICAL CENTER | Age: 68
End: 2023-05-24
Attending: FAMILY MEDICINE
Payer: MEDICARE

## 2023-05-24 DIAGNOSIS — Z12.31 VISIT FOR SCREENING MAMMOGRAM: ICD-10-CM

## 2023-05-24 PROCEDURE — 77063 BREAST TOMOSYNTHESIS BI: CPT

## 2023-07-05 ENCOUNTER — APPOINTMENT (RX ONLY)
Dept: URBAN - METROPOLITAN AREA CLINIC 35 | Facility: CLINIC | Age: 68
Setting detail: DERMATOLOGY
End: 2023-07-05

## 2023-07-05 DIAGNOSIS — D22 MELANOCYTIC NEVI: ICD-10-CM

## 2023-07-05 DIAGNOSIS — M71 OTHER BURSOPATHIES: ICD-10-CM

## 2023-07-05 DIAGNOSIS — L82.1 OTHER SEBORRHEIC KERATOSIS: ICD-10-CM

## 2023-07-05 DIAGNOSIS — Z71.89 OTHER SPECIFIED COUNSELING: ICD-10-CM

## 2023-07-05 DIAGNOSIS — L81.4 OTHER MELANIN HYPERPIGMENTATION: ICD-10-CM | Status: STABLE

## 2023-07-05 PROBLEM — D22.5 MELANOCYTIC NEVI OF TRUNK: Status: ACTIVE | Noted: 2023-07-05

## 2023-07-05 PROBLEM — M71.341 OTHER BURSAL CYST, RIGHT HAND: Status: ACTIVE | Noted: 2023-07-05

## 2023-07-05 PROCEDURE — ? OBSERVATION AND MEASURE

## 2023-07-05 PROCEDURE — ? COUNSELING

## 2023-07-05 PROCEDURE — 99213 OFFICE O/P EST LOW 20 MIN: CPT

## 2023-07-05 ASSESSMENT — LOCATION ZONE DERM
LOCATION ZONE: NECK
LOCATION ZONE: SCALP
LOCATION ZONE: FACE
LOCATION ZONE: FINGER
LOCATION ZONE: TRUNK
LOCATION ZONE: ARM

## 2023-07-05 ASSESSMENT — LOCATION DETAILED DESCRIPTION DERM
LOCATION DETAILED: LEFT SUPERIOR PARIETAL SCALP
LOCATION DETAILED: LEFT POSTERIOR SHOULDER
LOCATION DETAILED: RIGHT MID DORSAL MIDDLE FINGER
LOCATION DETAILED: RIGHT MEDIAL TRAPEZIAL NECK
LOCATION DETAILED: RIGHT SUPERIOR UPPER BACK
LOCATION DETAILED: LEFT SUPERIOR LATERAL MALAR CHEEK
LOCATION DETAILED: RIGHT LATERAL TRAPEZIAL NECK

## 2023-07-05 ASSESSMENT — LOCATION SIMPLE DESCRIPTION DERM
LOCATION SIMPLE: LEFT SHOULDER
LOCATION SIMPLE: POSTERIOR NECK
LOCATION SIMPLE: LEFT CHEEK
LOCATION SIMPLE: RIGHT MIDDLE FINGER
LOCATION SIMPLE: SCALP
LOCATION SIMPLE: RIGHT UPPER BACK

## 2024-04-30 ENCOUNTER — OFFICE VISIT (OUTPATIENT)
Dept: OPHTHALMOLOGY | Facility: MEDICAL CENTER | Age: 69
End: 2024-04-30
Payer: MEDICARE

## 2024-04-30 DIAGNOSIS — H40.2230 CHRONIC PRIMARY ANGLE-CLOSURE GLAUCOMA OF BOTH EYES, UNSPECIFIED GLAUCOMA STAGE: ICD-10-CM

## 2024-04-30 DIAGNOSIS — H49.9 OPHTHALMOPLEGIA: ICD-10-CM

## 2024-04-30 DIAGNOSIS — E05.00 GRAVES DISEASE: ICD-10-CM

## 2024-04-30 DIAGNOSIS — Z96.1 PSEUDOPHAKIA OF BOTH EYES: ICD-10-CM

## 2024-04-30 RX ORDER — LATANOPROSTENE BUNOD 0.24 MG/ML
SOLUTION/ DROPS OPHTHALMIC
COMMUNITY

## 2024-04-30 ASSESSMENT — REFRACTION_WEARINGRX
OD_SPHERE: -0.50
OS_SPHERE: -4.50
OD_VBASE: UP
OD_ADD: +2.50
OS_CYLINDER: +0.50
OS_ADD: +2.50
OS_AXIS: 060
OD_ADD: +2.50
SPECS_TYPE: BIFOCAL
OS_CYLINDER: +0.25
OD_AXIS: 045
OD_SPHERE: -0.50
OD_CYLINDER: +0.50
OD_VPRISM: 1/2
OS_SPHERE: -3.50
OS_AXIS: 145
OS_ADD: +2.50

## 2024-04-30 ASSESSMENT — TONOMETRY
IOP_METHOD: APPLANATION
OS_IOP_MMHG: 10
OD_IOP_MMHG: 10
OS_IOP_MMHG: 11
OD_IOP_MMHG: 11

## 2024-04-30 ASSESSMENT — CONF VISUAL FIELD
OD_SUPERIOR_TEMPORAL_RESTRICTION: 0
OD_INFERIOR_TEMPORAL_RESTRICTION: 0
OD_NORMAL: 1
OS_INFERIOR_TEMPORAL_RESTRICTION: 0
OD_INFERIOR_NASAL_RESTRICTION: 0
OS_INFERIOR_NASAL_RESTRICTION: 0
OS_NORMAL: 1
OS_SUPERIOR_NASAL_RESTRICTION: 0
OS_SUPERIOR_TEMPORAL_RESTRICTION: 0
OD_SUPERIOR_NASAL_RESTRICTION: 0

## 2024-04-30 ASSESSMENT — CUP TO DISC RATIO
OD_RATIO: 0.7
OS_RATIO: 0.8

## 2024-04-30 ASSESSMENT — REFRACTION_MANIFEST
OS_CYLINDER: +0.25
OD_SPHERE: -0.75
OD_AXIS: 038
OD_CYLINDER: +0.50
METHOD_AUTOREFRACTION: 1
OS_SPHERE: -4.50
OS_AXIS: 086

## 2024-04-30 ASSESSMENT — VISUAL ACUITY
OD_CC: J1+
OD_CC: 20/20
OS_CC: 20/25
OS_CC: J1+
METHOD: SNELLEN - LINEAR
CORRECTION_TYPE: GLASSES

## 2024-04-30 ASSESSMENT — SLIT LAMP EXAM - LIDS
COMMENTS: NORMAL
COMMENTS: NORMAL

## 2024-04-30 ASSESSMENT — EXTERNAL EXAM - LEFT EYE: OS_EXAM: PROPTOSIS

## 2024-04-30 ASSESSMENT — EXTERNAL EXAM - RIGHT EYE: OD_EXAM: PROPTOSIS

## 2024-04-30 NOTE — PROGRESS NOTES
Peds/Neuro Ophthalmology:   Kenny Castillo M.D.    Date & Time note created:    4/30/2024   3:51 PM     Referring MD / APRN:  Oscar Schrader M.D., No att. providers found    Patient ID:  Name:             Jacy Muhammad   YOB: 1955  Age:                 68 y.o.  female   MRN:               [id number]    Chief Complaint/Reason for Visit:     Other (Graves Disease)      History of Present Illness:    Jacy Muhammad is a 68 y.o. female   Follow up graves disease,ophthalmoplegia and glaucoma.Glaucoma drop has changed to Vyzulta.Vision good with glasses.Patient saw  last wednesday.        Review of Systems:  Review of Systems   All other systems reviewed and are negative.      Past Medical History:   Past Medical History:   Diagnosis Date    Allergy     Arthritis     Hyperlipidemia     Thyroid disease        Past Surgical History:  Past Surgical History:   Procedure Laterality Date    ABDOMINAL HYSTERECTOMY TOTAL      CATARACT EXTRACTION WITH IOL      EYE SURGERY      lasik    ROTATOR CUFF REPAIR         Current Outpatient Medications:  Current Outpatient Medications   Medication Sig Dispense Refill    Latanoprostene Bunod (VYZULTA) 0.024 % Solution Administer  into affected eye(s).      hydroCHLOROthiazide (HYDRODIURIL) 12.5 MG tablet Take 12.5 mg by mouth every day.      levothyroxine (SYNTHROID) 112 MCG Tab 137 mcg.      Calcium Carb-Cholecalciferol (CALCIUM 500 + D3 PO) Take  by mouth.      Turmeric 500 MG Cap Take  by mouth.      atorvastatin (LIPITOR) 10 MG Tab Take 10 mg by mouth every day.      levothyroxine (SYNTHROID) 125 MCG Tab       RESTASIS 0.05 % ophthalmic emulsion INSTILL 1 DROP INTO EACH EYE TWICE DAILY      Lutein-Zeaxanthin 15-4.75 MG Cap Take  by mouth. (Patient not taking: Reported on 4/30/2024)      latanoprost (XALATAN) 0.005 % Solution  (Patient not taking: Reported on 4/30/2024)      Levothyroxine Sodium (SYNTHROID PO) Take  by mouth. Takes 112 mcg and  125 mcg (Patient not taking: Reported on 4/30/2024)       No current facility-administered medications for this visit.       Allergies:  Allergies   Allergen Reactions    Pcn [Penicillins]     Sulfamethoxazole        Family History:  Family History   Problem Relation Age of Onset    Stroke Mother     Heart Disease Father        Social History:  Social History     Socioeconomic History    Marital status: Single     Spouse name: Not on file    Number of children: Not on file    Years of education: Not on file    Highest education level: Not on file   Occupational History    Not on file   Tobacco Use    Smoking status: Unknown    Smokeless tobacco: Never   Substance and Sexual Activity    Alcohol use: Not on file    Drug use: Not on file    Sexual activity: Not on file   Other Topics Concern    Not on file   Social History Narrative    Retired     Social Determinants of Health     Financial Resource Strain: Not on file   Food Insecurity: Not on file   Transportation Needs: Not on file   Physical Activity: Not on file   Stress: Not on file   Social Connections: Not on file   Intimate Partner Violence: Not on file   Housing Stability: Not on file          Physical Exam:  Physical Exam    Oriented x 3  Weight/BMI: There is no height or weight on file to calculate BMI.  There were no vitals taken for this visit.    Base Eye Exam       Visual Acuity (Snellen - Linear)         Right Left    Dist cc 20/20 20/25    Near cc J1+ J1+      Correction: Glasses              Tonometry (i care, 1:20 PM)         Right Left    Pressure 11 10              Tonometry #2 (Applanation, 1:57 PM)         Right Left    Pressure 10 11              Pupils         Pupils    Right PERRL    Left PERRL              Visual Fields         Right Left     Full Full              Neuro/Psych       Oriented x3: Yes    Mood/Affect: Normal                  Strabismus Exam       Correction: sc      Distance Near Near +3DS N Bifocals                      0 0 0    0 0 0                      ET 2 -1  0  Ortho  0  -1  ET 2                     0 0 0   0 0 0                3/2/2021 - without 2 ET distance  3/2/2022 - With glasses on that has a 2 base UP OS, has a 2 Left hyper. Still 2 ET without glasses       Slit Lamp and Fundus Exam       External Exam         Right Left    External Proptosis Proptosis              Slit Lamp Exam         Right Left    Lids/Lashes Normal Normal    Conjunctiva/Sclera White and quiet White and quiet    Cornea Clear Clear    Anterior Chamber Deep and quiet Deep and quiet    Iris Round and reactive Round and reactive    Lens Posterior chamber intraocular lens Posterior chamber intraocular lens    Vitreous Normal Normal              Fundus Exam         Right Left    Disc Tilted cup Tilted cup    C/D Ratio 0.7 0.8    Macula Normal Normal    Vessels Normal Normal    Periphery Normal Normal                  Refraction       Wearing Rx         Sphere Cylinder Axis Add Vert Prism    Right -0.50 +0.50 045 +2.50 1/2 up    Left -3.50 +0.50 145 +2.50       Type: Bifocal              Wearing Rx #2         Sphere Cylinder Axis Add Vert Prism    Right -0.50   +2.50     Left -4.50 +0.25 060 +2.50               Manifest Refraction (Auto)         Sphere Cylinder Axis    Right -0.75 +0.50 038    Left -4.50 +0.25 086                    Pertinent Lab/Test/Imaging Review:      Assessment and Plan:     Ophthalmoplegia  3/2/2021 - in comitant small angle esotropia worse on left and right horizontal gaze associated with a mild abduction deficit, history of high myopia and graves disease. 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 Some break down in fusion when not wearing glasses, and was made monovision following cataract extraction. I discussed option of prisms in glasses or surgery, but since no diplopia with glasses on discussed continuing to monitor.   3/2/2022 - Got prism glasses  by optom with 2 base up OS, but has induced a small 2 left hyper. Given fusional amplitudes can tolerate. Also possibly has a small 1 base out OD. Orth from horizontal with glasses, but without develops a 2 ET most likely exacerbated by monovosion. Overall stable.   4/24/2023-still has very mild limited abduction deficit however I cannot measure a vertical component.  I then proceeded with Pettit negar testing which did not demonstrate a vertical deviation.  She states that sometimes when she is tired looking to the side she might get a little double vision however in prism adaptation testing today she did actually better without prisms.  We will therefore give her new glasses Rx and take out the prism  4/30/2024 -never got Rx.  However had virtual visit with optometrist who gave new Rx with one half base up prism right eye.  Can break down to a small intermittent esotropia but currently fusing.  Therefore will monitor    Graves disease  3/2/2021 - mild stare and lid retraction, johnson not appear to be progressive, but discussed if worsens would be a candidate for tepezza  3/2/2022 - still with mild tare, lid retraction and dry eye. Overall stable and uses restasis  4/24/2023-still with mild stare, lid retraction, dry eye and moderate proptosis.  Discussed Tepezza however at this time she was not interested.  4/30/2024 -overall stable.  No apparent progression    Chronic primary angle-closure glaucoma of both eyes  3/2/2021 - sp stents during cataract extraction, but now back on drops. IOP today 11 OU. Being monitored by [facility]  3/2/2022 - IOP stable, now on Xalatan, being followed by glaucoma  4/24/2023-IOP stable on Xalatan.  OCT nerve fiber layer thickness 60 OU with superior and inferior thinning.  However there is significant peripapillary atrophy worse in the left eye secondary to her myopia  4/30/2024 - In left filed of vision test by [facility]. Changed to Vyzulta.  IOP low today.  OCT  neurofibrillary thickness stable at 67 OD 57 OS.  Stable cup to disc ratio    Pseudophakia of both eyes  3/2/2021 - IOL in place, monovision. OS for near. Blurry vision without balance from glasses makes fusion more difficut  4/24/2023-IOL in place, continued monovision, no development of significant PCO.  4/30/2024 -  overall no change        Kenny Castillo M.D.

## 2024-04-30 NOTE — ASSESSMENT & PLAN NOTE
3/2/2021 - in comitant small angle esotropia worse on left and right horizontal gaze associated with a mild abduction deficit, history of high myopia and graves disease. 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 Some break down in fusion when not wearing glasses, and was made monovision following cataract extraction. I discussed option of prisms in glasses or surgery, but since no diplopia with glasses on discussed continuing to monitor.   3/2/2022 - Got prism glasses by optom with 2 base up OS, but has induced a small 2 left hyper. Given fusional amplitudes can tolerate. Also possibly has a small 1 base out OD. Orth from horizontal with glasses, but without develops a 2 ET most likely exacerbated by monovosion. Overall stable.   4/24/2023-still has very mild limited abduction deficit however I cannot measure a vertical component.  I then proceeded with Pettit negar testing which did not demonstrate a vertical deviation.  She states that sometimes when she is tired looking to the side she might get a little double vision however in prism adaptation testing today she did actually better without prisms.  We will therefore give her new glasses Rx and take out the prism  4/30/2024 -never got Rx.  However had virtual visit with optometrist who gave new Rx with one half base up prism right eye.  Can break down to a small intermittent esotropia but currently fusing.  Therefore will monitor

## 2024-04-30 NOTE — ASSESSMENT & PLAN NOTE
3/2/2021 - mild stare and lid retraction, johnson not appear to be progressive, but discussed if worsens would be a candidate for tepezza  3/2/2022 - still with mild tare, lid retraction and dry eye. Overall stable and uses restasis  4/24/2023-still with mild stare, lid retraction, dry eye and moderate proptosis.  Discussed Tepezza however at this time she was not interested.  4/30/2024 -overall stable.  No apparent progression

## 2024-04-30 NOTE — ASSESSMENT & PLAN NOTE
3/2/2021 - sp stents during cataract extraction, but now back on drops. IOP today 11 OU. Being monitored by [facility]  3/2/2022 - IOP stable, now on Xalatan, being followed by glaucoma  4/24/2023-IOP stable on Xalatan.  OCT nerve fiber layer thickness 60 OU with superior and inferior thinning.  However there is significant peripapillary atrophy worse in the left eye secondary to her myopia  4/30/2024 - In left filed of vision test by [facility]. Changed to Vyzulta.  IOP low today.  OCT neurofibrillary thickness stable at 67 OD 57 OS.  Stable cup to disc ratio

## 2024-04-30 NOTE — ASSESSMENT & PLAN NOTE
3/2/2021 - IOL in place, monovision. OS for near. Blurry vision without balance from glasses makes fusion more difficut  4/24/2023-IOL in place, continued monovision, no development of significant PCO.  4/30/2024 -  overall no change

## 2024-06-10 ENCOUNTER — HOSPITAL ENCOUNTER (OUTPATIENT)
Dept: RADIOLOGY | Facility: MEDICAL CENTER | Age: 69
End: 2024-06-10
Attending: FAMILY MEDICINE
Payer: COMMERCIAL

## 2024-06-10 DIAGNOSIS — Z12.31 SCREENING MAMMOGRAM, ENCOUNTER FOR: ICD-10-CM

## 2024-06-10 PROCEDURE — 77063 BREAST TOMOSYNTHESIS BI: CPT

## 2024-06-21 NOTE — PROCEDURE: REASSURANCE
Detail Level: Generalized
Hide Include Location In Plan Question?: No
Attending and PA/NP shared services statement (NON-critical care):

## 2024-07-10 ENCOUNTER — APPOINTMENT (RX ONLY)
Dept: URBAN - METROPOLITAN AREA CLINIC 35 | Facility: CLINIC | Age: 69
Setting detail: DERMATOLOGY
End: 2024-07-10

## 2024-07-10 DIAGNOSIS — D22 MELANOCYTIC NEVI: ICD-10-CM

## 2024-07-10 DIAGNOSIS — L57.0 ACTINIC KERATOSIS: ICD-10-CM

## 2024-07-10 DIAGNOSIS — L82.1 OTHER SEBORRHEIC KERATOSIS: ICD-10-CM

## 2024-07-10 DIAGNOSIS — Z71.89 OTHER SPECIFIED COUNSELING: ICD-10-CM

## 2024-07-10 DIAGNOSIS — L81.4 OTHER MELANIN HYPERPIGMENTATION: ICD-10-CM | Status: UNCHANGED

## 2024-07-10 PROBLEM — D22.5 MELANOCYTIC NEVI OF TRUNK: Status: ACTIVE | Noted: 2024-07-10

## 2024-07-10 PROCEDURE — 99213 OFFICE O/P EST LOW 20 MIN: CPT | Mod: 25

## 2024-07-10 PROCEDURE — ? OBSERVATION AND MEASURE

## 2024-07-10 PROCEDURE — 17000 DESTRUCT PREMALG LESION: CPT

## 2024-07-10 PROCEDURE — ? LIQUID NITROGEN

## 2024-07-10 PROCEDURE — ? COUNSELING

## 2024-07-10 ASSESSMENT — LOCATION SIMPLE DESCRIPTION DERM
LOCATION SIMPLE: NOSE
LOCATION SIMPLE: LEFT CHEEK
LOCATION SIMPLE: LEFT SHOULDER
LOCATION SIMPLE: SCALP
LOCATION SIMPLE: RIGHT UPPER BACK
LOCATION SIMPLE: POSTERIOR NECK
LOCATION SIMPLE: RIGHT FOREHEAD

## 2024-07-10 ASSESSMENT — LOCATION ZONE DERM
LOCATION ZONE: NOSE
LOCATION ZONE: FACE
LOCATION ZONE: ARM
LOCATION ZONE: TRUNK
LOCATION ZONE: NECK
LOCATION ZONE: SCALP

## 2024-07-10 ASSESSMENT — LOCATION DETAILED DESCRIPTION DERM
LOCATION DETAILED: NASAL TIP
LOCATION DETAILED: LEFT SUPERIOR LATERAL MALAR CHEEK
LOCATION DETAILED: RIGHT LATERAL TRAPEZIAL NECK
LOCATION DETAILED: LEFT POSTERIOR SHOULDER
LOCATION DETAILED: LEFT SUPERIOR PARIETAL SCALP
LOCATION DETAILED: RIGHT SUPERIOR UPPER BACK
LOCATION DETAILED: RIGHT LATERAL FOREHEAD
LOCATION DETAILED: RIGHT MEDIAL TRAPEZIAL NECK

## 2024-07-10 NOTE — PROCEDURE: LIQUID NITROGEN
Consent: The patient's consent was obtained including but not limited to risks of crusting, scabbing, blistering, scarring, darker or lighter pigmentary change, recurrence, incomplete removal and infection.
Show Aperture Variable?: Yes
Render Note In Bullet Format When Appropriate: No
Number Of Freeze-Thaw Cycles: 1 freeze-thaw cycle
Detail Level: Detailed
Post-Care Instructions: I reviewed with the patient in detail post-care instructions. Patient is to wear sunprotection, and avoid picking at any of the treated lesions. Pt may apply Vaseline to crusted or scabbing areas.
Duration Of Freeze Thaw-Cycle (Seconds): 10
Application Tool (Optional): Cry-AC

## 2025-05-20 ENCOUNTER — APPOINTMENT (OUTPATIENT)
Dept: OPHTHALMOLOGY | Facility: MEDICAL CENTER | Age: 70
End: 2025-05-20
Payer: MEDICARE

## 2025-05-20 DIAGNOSIS — H40.2230 CHRONIC PRIMARY ANGLE-CLOSURE GLAUCOMA OF BOTH EYES, UNSPECIFIED GLAUCOMA STAGE: Primary | ICD-10-CM

## 2025-05-20 DIAGNOSIS — Z96.1 PSEUDOPHAKIA OF BOTH EYES: ICD-10-CM

## 2025-05-20 DIAGNOSIS — H49.9 OPHTHALMOPLEGIA: ICD-10-CM

## 2025-05-20 DIAGNOSIS — E05.00 GRAVES DISEASE: ICD-10-CM

## 2025-05-20 PROCEDURE — 92083 EXTENDED VISUAL FIELD XM: CPT | Performed by: OPHTHALMOLOGY

## 2025-05-20 PROCEDURE — 99214 OFFICE O/P EST MOD 30 MIN: CPT | Mod: 25 | Performed by: OPHTHALMOLOGY

## 2025-05-20 PROCEDURE — 92060 SENSORIMOTOR EXAMINATION: CPT | Performed by: OPHTHALMOLOGY

## 2025-05-20 PROCEDURE — 92250 FUNDUS PHOTOGRAPHY W/I&R: CPT | Performed by: OPHTHALMOLOGY

## 2025-05-20 ASSESSMENT — REFRACTION_WEARINGRX
OD_CYLINDER: +0.25
OS_AXIS: 151
OD_VPRISM: 0.25
OD_HPRISM: 1.00
OS_CYLINDER: +0.50
SPECS_TYPE: PAL
OD_ADD: +2.50
OS_VPRISM: 2.75
OS_SPHERE: -3.50
OD_SPHERE: -0.25
OD_AXIS: 028
OS_ADD: +2.50
OS_HPRISM: 0.25

## 2025-05-20 ASSESSMENT — TONOMETRY
OD_IOP_MMHG: 9
OS_IOP_MMHG: 10
IOP_METHOD: ICARE

## 2025-05-20 ASSESSMENT — VISUAL ACUITY
OS_CC+: +2
OS_CC: 20/25
OD_CC+: -2
OD_CC: 20/20
METHOD: SNELLEN - LINEAR
CORRECTION_TYPE: GLASSES

## 2025-05-20 ASSESSMENT — CUP TO DISC RATIO
OD_RATIO: 0.7
OS_RATIO: 0.8

## 2025-05-20 ASSESSMENT — REFRACTION_MANIFEST
METHOD_AUTOREFRACTION: 1
OS_CYLINDER: +0.50
OD_SPHERE: -0.75
OS_AXIS: 113
OS_SPHERE: -4.50
OD_CYLINDER: +0.25
OD_AXIS: 067

## 2025-05-20 ASSESSMENT — CONF VISUAL FIELD
OD_SUPERIOR_NASAL_RESTRICTION: 0
OD_NORMAL: 1
OD_INFERIOR_NASAL_RESTRICTION: 0
OD_INFERIOR_TEMPORAL_RESTRICTION: 0
OD_SUPERIOR_TEMPORAL_RESTRICTION: 0
OS_INFERIOR_NASAL_RESTRICTION: 3

## 2025-05-20 ASSESSMENT — EXTERNAL EXAM - LEFT EYE: OS_EXAM: PROPTOSIS

## 2025-05-20 ASSESSMENT — EXTERNAL EXAM - RIGHT EYE: OD_EXAM: PROPTOSIS

## 2025-05-20 ASSESSMENT — SLIT LAMP EXAM - LIDS
COMMENTS: NORMAL
COMMENTS: NORMAL

## 2025-05-20 NOTE — PROGRESS NOTES
Peds/Neuro Ophthalmology:   Kenny Castillo M.D.    Date & Time note created:    5/20/2025   4:13 PM     Referring MD / APRN:  Oscar Schrader M.D., No att. providers found    Patient ID:  Name:             Jacy Muhammad   YOB: 1955  Age:                 69 y.o.  female   MRN:               [id number]    Chief Complaint/Reason for Visit:     Other (Ophthalmoplegia )      History of Present Illness:    Jacy Muhammad is a 69 y.o. female   Patient here for ophthalmoplegia follow up. Patient states without glasses, still having to tilt right with double vision. Patient states while wearing glasses double vision clears.     Other        Review of Systems:  Review of Systems   Eyes:         Ophthalmoplegia   Chronic primary angle-closure glaucoma of both eyes  Pseudophakia    Endo/Heme/Allergies:         Graves disease    All other systems reviewed and are negative.      Past Medical History:   Past Medical History[1]    Past Surgical History:  Past Surgical History[2]    Current Outpatient Medications:  Current Medications[3]    Allergies:  Allergies[4]    Family History:  Family History   Problem Relation Age of Onset    Stroke Mother     Heart Disease Father        Social History:  Social History     Socioeconomic History    Marital status: Single     Spouse name: Not on file    Number of children: Not on file    Years of education: Not on file    Highest education level: Not on file   Occupational History    Not on file   Tobacco Use    Smoking status: Unknown    Smokeless tobacco: Never   Substance and Sexual Activity    Alcohol use: Not on file    Drug use: Not on file    Sexual activity: Not on file   Other Topics Concern    Not on file   Social History Narrative    Retired     Social Drivers of Health     Financial Resource Strain: Not on file   Food Insecurity: Not on file   Transportation Needs: Not on file   Physical Activity: Not on file   Stress: Not on file   Social  Connections: Not on file   Intimate Partner Violence: Not on file   Housing Stability: Not on file          Physical Exam:  Physical Exam    Oriented x 3  Weight/BMI: There is no height or weight on file to calculate BMI.  There were no vitals taken for this visit.    Base Eye Exam       Visual Acuity (Snellen - Linear)         Right Left    Dist cc 20/20 -2 20/25 +2      Correction: Glasses              Tonometry (Icare, 1:17 PM)         Right Left    Pressure 9 10              Pupils         Pupils    Right PERRL    Left PERRL              Visual Fields         Right Left     Full                                 Extraocular Movement         Right Left     Full Full              Neuro/Psych       Oriented x3: Yes    Mood/Affect: Normal                  Additional Tests       Color         Right Left    Ishihara 8/8 8/8              Stereo       Fly: +                  Strabismus Exam       Correction: sc      Distance Near Near +3DS N Bifocals                      0 0 0   0 0 0                      ET 2 -1  0  Ortho  0  -1  ET 2                     0 0 0   0 0 0                3/2/2021 - without 2 ET distance  3/2/2022 - With glasses on that has a 2 base UP OS, has a 2 Left hyper. Still 2 ET without glasses       Slit Lamp and Fundus Exam       External Exam         Right Left    External Proptosis Proptosis              Slit Lamp Exam         Right Left    Lids/Lashes Normal Normal    Conjunctiva/Sclera White and quiet White and quiet    Cornea Clear Clear    Anterior Chamber Deep and quiet Deep and quiet    Iris Round and reactive Round and reactive    Lens Posterior chamber intraocular lens Posterior chamber intraocular lens    Vitreous Normal Normal              Fundus Exam         Right Left    Disc Tilted cup Tilted cup    C/D Ratio 0.7 0.8    Macula Normal Normal    Vessels Normal Normal    Periphery Normal Normal                  Refraction       Wearing Rx         Sphere Cylinder Axis Add Horz Prism Vert  Prism    Right -0.25 +0.25 028 +2.50 1.00 0.25    Left -3.50 +0.50 151 +2.50 0.25 2.75      Type: PAL              Manifest Refraction (Auto)         Sphere Cylinder Axis    Right -0.75 +0.25 067    Left -4.50 +0.50 113                    Pertinent Lab/Test/Imaging Review:      Assessment and Plan:     Chronic primary angle-closure glaucoma of both eyes  3/2/2021 - sp stents during cataract extraction, but now back on drops. IOP today 11 OU. Being monitored by [facility]  3/2/2022 - IOP stable, now on Xalatan, being followed by glaucoma  4/24/2023-IOP stable on Xalatan.  OCT nerve fiber layer thickness 60 OU with superior and inferior thinning.  However there is significant peripapillary atrophy worse in the left eye secondary to her myopia  4/30/2024 - In left filed of vision test by [facility]. Changed to Vyzulta.  IOP low today.  OCT neurofibrillary thickness stable at 67 OD 57 OS.  Stable cup to disc ratio  5/20/2025-overall stable.  No increased cupping.  OCT new fibrillar thickness 60 OD 59 OS, Clemons visual field obtained that demonstrated stable inferior arcuate defect within small nasal step.    Graves disease  3/2/2021 - mild stare and lid retraction, johnson not appear to be progressive, but discussed if worsens would be a candidate for tepezza  3/2/2022 - still with mild tare, lid retraction and dry eye. Overall stable and uses restasis  4/24/2023-still with mild stare, lid retraction, dry eye and moderate proptosis.  Discussed Tepezza however at this time she was not interested.  4/30/2024 -overall stable.  No apparent progression  5/20/2025-no progression    Ophthalmoplegia  3/2/2021 - in comitant small angle esotropia worse on left and right horizontal gaze associated with a mild abduction deficit, history of high myopia and graves disease. 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 Some break down in fusion  when not wearing glasses, and was made monovision following cataract extraction. I discussed option of prisms in glasses or surgery, but since no diplopia with glasses on discussed continuing to monitor.   3/2/2022 - Got prism glasses by optom with 2 base up OS, but has induced a small 2 left hyper. Given fusional amplitudes can tolerate. Also possibly has a small 1 base out OD. Orth from horizontal with glasses, but without develops a 2 ET most likely exacerbated by monovosion. Overall stable.   4/24/2023-still has very mild limited abduction deficit however I cannot measure a vertical component.  I then proceeded with Pettit negar testing which did not demonstrate a vertical deviation.  She states that sometimes when she is tired looking to the side she might get a little double vision however in prism adaptation testing today she did actually better without prisms.  We will therefore give her new glasses Rx and take out the prism  4/30/2024 -never got Rx.  However had virtual visit with optometrist who gave new Rx with one half base up prism right eye.  Can break down to a small intermittent esotropia but currently fusing.  Therefore will monitor  5/20/2025-doing well when wearing glasses Rx    Pseudophakia of both eyes  3/2/2021 - IOL in place, monovision. OS for near. Blurry vision without balance from glasses makes fusion more difficut  4/24/2023-IOL in place, continued monovision, no development of significant PCO.  4/30/2024 -  overall no change  5/20/2025-IOL intact        Kenny Castillo M.D.         [1]   Past Medical History:  Diagnosis Date    Allergy     Arthritis     Hyperlipidemia     Thyroid disease    [2]   Past Surgical History:  Procedure Laterality Date    ABDOMINAL HYSTERECTOMY TOTAL      CATARACT EXTRACTION WITH IOL      EYE SURGERY      lasik    ROTATOR CUFF REPAIR     [3]   Current Outpatient Medications   Medication Sig Dispense Refill    levothyroxine (SYNTHROID) 112 MCG Tab 137 mcg.  (Patient taking differently: 100 mcg every day.)      Latanoprostene Bunod (VYZULTA) 0.024 % Solution Administer  into affected eye(s).      hydroCHLOROthiazide (HYDRODIURIL) 12.5 MG tablet Take 12.5 mg by mouth every day.      Calcium Carb-Cholecalciferol (CALCIUM 500 + D3 PO) Take  by mouth.      Turmeric 500 MG Cap Take  by mouth.      Lutein-Zeaxanthin 15-4.75 MG Cap Take  by mouth. (Patient not taking: Reported on 4/30/2024)      atorvastatin (LIPITOR) 10 MG Tab Take 10 mg by mouth every day.      levothyroxine (SYNTHROID) 125 MCG Tab       RESTASIS 0.05 % ophthalmic emulsion INSTILL 1 DROP INTO EACH EYE TWICE DAILY      latanoprost (XALATAN) 0.005 % Solution  (Patient not taking: Reported on 4/30/2024)      Levothyroxine Sodium (SYNTHROID PO) Take  by mouth. Takes 112 mcg and 125 mcg (Patient not taking: Reported on 4/30/2024)       No current facility-administered medications for this visit.   [4]   Allergies  Allergen Reactions    Pcn [Penicillins]     Sulfamethoxazole

## 2025-05-20 NOTE — ASSESSMENT & PLAN NOTE
3/2/2021 - mild stare and lid retraction, johnson not appear to be progressive, but discussed if worsens would be a candidate for tepezza  3/2/2022 - still with mild tare, lid retraction and dry eye. Overall stable and uses restasis  4/24/2023-still with mild stare, lid retraction, dry eye and moderate proptosis.  Discussed Tepezza however at this time she was not interested.  4/30/2024 -overall stable.  No apparent progression  5/20/2025-no progression

## 2025-05-20 NOTE — ASSESSMENT & PLAN NOTE
3/2/2021 - IOL in place, monovision. OS for near. Blurry vision without balance from glasses makes fusion more difficut  4/24/2023-IOL in place, continued monovision, no development of significant PCO.  4/30/2024 -  overall no change  5/20/2025-IOL intact

## 2025-05-20 NOTE — ASSESSMENT & PLAN NOTE
3/2/2021 - sp stents during cataract extraction, but now back on drops. IOP today 11 OU. Being monitored by [facility]  3/2/2022 - IOP stable, now on Xalatan, being followed by glaucoma  4/24/2023-IOP stable on Xalatan.  OCT nerve fiber layer thickness 60 OU with superior and inferior thinning.  However there is significant peripapillary atrophy worse in the left eye secondary to her myopia  4/30/2024 - In left filed of vision test by [facility]. Changed to Vyzulta.  IOP low today.  OCT neurofibrillary thickness stable at 67 OD 57 OS.  Stable cup to disc ratio  5/20/2025-overall stable.  No increased cupping.  OCT new fibrillar thickness 60 OD 59 OS, Clemons visual field obtained that demonstrated stable inferior arcuate defect within small nasal step.

## 2025-05-20 NOTE — ASSESSMENT & PLAN NOTE
3/2/2021 - in comitant small angle esotropia worse on left and right horizontal gaze associated with a mild abduction deficit, history of high myopia and graves disease. 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 Some break down in fusion when not wearing glasses, and was made monovision following cataract extraction. I discussed option of prisms in glasses or surgery, but since no diplopia with glasses on discussed continuing to monitor.   3/2/2022 - Got prism glasses by optom with 2 base up OS, but has induced a small 2 left hyper. Given fusional amplitudes can tolerate. Also possibly has a small 1 base out OD. Orth from horizontal with glasses, but without develops a 2 ET most likely exacerbated by monovosion. Overall stable.   4/24/2023-still has very mild limited abduction deficit however I cannot measure a vertical component.  I then proceeded with Pettit negar testing which did not demonstrate a vertical deviation.  She states that sometimes when she is tired looking to the side she might get a little double vision however in prism adaptation testing today she did actually better without prisms.  We will therefore give her new glasses Rx and take out the prism  4/30/2024 -never got Rx.  However had virtual visit with optometrist who gave new Rx with one half base up prism right eye.  Can break down to a small intermittent esotropia but currently fusing.  Therefore will monitor  5/20/2025-doing well when wearing glasses Rx

## 2025-06-11 ENCOUNTER — HOSPITAL ENCOUNTER (OUTPATIENT)
Dept: RADIOLOGY | Facility: MEDICAL CENTER | Age: 70
End: 2025-06-11
Attending: FAMILY MEDICINE
Payer: MEDICARE

## 2025-06-11 DIAGNOSIS — Z12.31 VISIT FOR SCREENING MAMMOGRAM: ICD-10-CM

## 2025-06-11 PROCEDURE — 77063 BREAST TOMOSYNTHESIS BI: CPT

## 2025-07-10 ENCOUNTER — APPOINTMENT (OUTPATIENT)
Dept: URBAN - METROPOLITAN AREA CLINIC 35 | Facility: CLINIC | Age: 70
Setting detail: DERMATOLOGY
End: 2025-07-10

## 2025-07-10 DIAGNOSIS — D485 NEOPLASM OF UNCERTAIN BEHAVIOR OF SKIN: ICD-10-CM

## 2025-07-10 DIAGNOSIS — L57.0 ACTINIC KERATOSIS: ICD-10-CM

## 2025-07-10 DIAGNOSIS — Z71.89 OTHER SPECIFIED COUNSELING: ICD-10-CM

## 2025-07-10 DIAGNOSIS — L81.4 OTHER MELANIN HYPERPIGMENTATION: ICD-10-CM

## 2025-07-10 DIAGNOSIS — L82.1 OTHER SEBORRHEIC KERATOSIS: ICD-10-CM

## 2025-07-10 DIAGNOSIS — D22 MELANOCYTIC NEVI: ICD-10-CM

## 2025-07-10 DIAGNOSIS — L82.0 INFLAMED SEBORRHEIC KERATOSIS: ICD-10-CM

## 2025-07-10 PROBLEM — D22.5 MELANOCYTIC NEVI OF TRUNK: Status: ACTIVE | Noted: 2025-07-10

## 2025-07-10 PROBLEM — D48.5 NEOPLASM OF UNCERTAIN BEHAVIOR OF SKIN: Status: ACTIVE | Noted: 2025-07-10

## 2025-07-10 PROCEDURE — ? LIQUID NITROGEN

## 2025-07-10 PROCEDURE — ? COUNSELING

## 2025-07-10 PROCEDURE — ? BIOPSY BY SHAVE METHOD

## 2025-07-10 PROCEDURE — ? OBSERVATION

## 2025-07-10 ASSESSMENT — LOCATION DETAILED DESCRIPTION DERM
LOCATION DETAILED: RIGHT LATERAL TRAPEZIAL NECK
LOCATION DETAILED: SUPERIOR THORACIC SPINE
LOCATION DETAILED: RIGHT POSTERIOR SHOULDER
LOCATION DETAILED: LEFT INFERIOR FOREHEAD
LOCATION DETAILED: RIGHT LATERAL FOREHEAD
LOCATION DETAILED: LEFT SUPERIOR FOREHEAD
LOCATION DETAILED: LEFT SUPERIOR LATERAL MALAR CHEEK
LOCATION DETAILED: LEFT POSTERIOR SHOULDER
LOCATION DETAILED: LEFT VENTRAL DISTAL FOREARM
LOCATION DETAILED: LEFT VENTRAL PROXIMAL FOREARM
LOCATION DETAILED: RIGHT MEDIAL TRAPEZIAL NECK
LOCATION DETAILED: RIGHT SUPERIOR UPPER BACK
LOCATION DETAILED: LEFT SUPERIOR PARIETAL SCALP

## 2025-07-10 ASSESSMENT — LOCATION SIMPLE DESCRIPTION DERM
LOCATION SIMPLE: RIGHT UPPER BACK
LOCATION SIMPLE: RIGHT FOREHEAD
LOCATION SIMPLE: UPPER BACK
LOCATION SIMPLE: RIGHT SHOULDER
LOCATION SIMPLE: LEFT FOREHEAD
LOCATION SIMPLE: LEFT SHOULDER
LOCATION SIMPLE: LEFT FOREARM
LOCATION SIMPLE: LEFT CHEEK
LOCATION SIMPLE: SCALP
LOCATION SIMPLE: POSTERIOR NECK

## 2025-07-10 ASSESSMENT — LOCATION ZONE DERM
LOCATION ZONE: ARM
LOCATION ZONE: FACE
LOCATION ZONE: TRUNK
LOCATION ZONE: NECK
LOCATION ZONE: SCALP